# Patient Record
Sex: MALE | Race: WHITE | NOT HISPANIC OR LATINO | Employment: PART TIME | URBAN - METROPOLITAN AREA
[De-identification: names, ages, dates, MRNs, and addresses within clinical notes are randomized per-mention and may not be internally consistent; named-entity substitution may affect disease eponyms.]

---

## 2017-08-28 ENCOUNTER — NURSE TRIAGE (OUTPATIENT)
Dept: NURSING | Facility: CLINIC | Age: 52
End: 2017-08-28

## 2017-08-28 NOTE — TELEPHONE ENCOUNTER
"  Reason for Disposition    MILD rectal bleeding (more than just a few drops or streaks)    Additional Information    Negative: Shock suspected (e.g., cold/pale/clammy skin, too weak to stand, low BP, rapid pulse)    Negative: Difficult to awaken or acting confused  (e.g., disoriented, slurred speech)    Negative: Passed out (i.e., lost consciousness, collapsed and was not responding)    Negative: [1] Vomiting AND [2] contains red blood or black (\"coffee ground\") material  (Exception: few red streaks in vomit that only happened once)    Negative: Sounds like a life-threatening emergency to the triager    Negative: Diarrhea is main symptom    Negative: Stool color other than brown or tan is main concern  (no bleeding and no melena)    Negative: SEVERE rectal bleeding (large blood clots; on and off, or constant bleeding)    Negative: SEVERE dizziness (e.g., unable to stand, requires support to walk, feels like passing out now)    Negative: [1] MODERATE rectal bleeding (small blood clots, passing blood without stool, or toilet water turns red) AND [2] more than once a day    Negative: Pale skin (pallor) of new onset or worsening    Negative: Tarry or jet black-colored stool (not dark green)    Negative: [1] Constant abdominal pain AND [2] present > 2 hours    Negative: Rectal foreign body (i.e., now or within past week;  inserted or swallowed)    Negative: High-risk adult (e.g., prior surgery on aorta, abdominal aortic aneurysm)    Negative: Taking Coumadin (warfarin) or other strong blood thinner, or known bleeding disorder (e.g., thrombocytopenia)    Negative: Known cirrhosis of the liver (or history of liver failure or ascites)    Negative: [1] Colonoscopy AND [2] in past 72 hours    Negative: Patient sounds very sick or weak to the triager    Negative: MODERATE rectal bleeding (small blood clots, passing blood without stool, or toilet water turns red)    Protocols used: RECTAL BLEEDING-ADULT-AH  No clots or fever " or dizziness.

## 2017-08-29 ENCOUNTER — OFFICE VISIT (OUTPATIENT)
Dept: INTERNAL MEDICINE | Facility: CLINIC | Age: 52
End: 2017-08-29
Payer: COMMERCIAL

## 2017-08-29 VITALS
WEIGHT: 173 LBS | TEMPERATURE: 98.5 F | OXYGEN SATURATION: 99 % | SYSTOLIC BLOOD PRESSURE: 130 MMHG | DIASTOLIC BLOOD PRESSURE: 72 MMHG | HEART RATE: 65 BPM

## 2017-08-29 DIAGNOSIS — F12.10 CANNABIS ABUSE: ICD-10-CM

## 2017-08-29 DIAGNOSIS — K62.5 HEMORRHAGE OF RECTUM AND ANUS: Primary | ICD-10-CM

## 2017-08-29 DIAGNOSIS — Z13.6 CARDIOVASCULAR SCREENING; LDL GOAL LESS THAN 160: ICD-10-CM

## 2017-08-29 DIAGNOSIS — Z12.11 COLON CANCER SCREENING: ICD-10-CM

## 2017-08-29 PROCEDURE — 99202 OFFICE O/P NEW SF 15 MIN: CPT | Performed by: INTERNAL MEDICINE

## 2017-08-29 RX ORDER — FINASTERIDE 5 MG/1
TABLET, FILM COATED ORAL
Refills: 4 | COMMUNITY
Start: 2017-06-01

## 2017-08-29 NOTE — NURSING NOTE
Chief Complaint   Patient presents with     Rectal Problem       Initial /72  Pulse 65  Temp 98.5  F (36.9  C) (Oral)  Wt 173 lb (78.5 kg)  SpO2 99% There is no height or weight on file to calculate BMI.  Medication Reconciliation: complete

## 2017-08-29 NOTE — PROGRESS NOTES
"  SUBJECTIVE:   Samuel Urias is a 52 year old male who presents to clinic today for the following health issues:    New patient to me.    Rectal Bleeding, x 2 days but unsure if blood or related to a \"beet\" supplement that he is taking or not.  Taking supplement intermittently.    Problem list and histories reviewed & adjusted, as indicated.  Additional history: as documented    Patient Active Problem List   Diagnosis     CARDIOVASCULAR SCREENING; LDL GOAL LESS THAN 160     Cannabis abuse     No past surgical history on file.    Social History   Substance Use Topics     Smoking status: Never Smoker     Smokeless tobacco: Never Used     Alcohol use Not on file     No family history on file.      No current outpatient prescriptions on file.     Allergies not on file  BP Readings from Last 3 Encounters:   No data found for BP    Wt Readings from Last 3 Encounters:   No data found for Wt          Reviewed and updated as needed this visit by clinical staff     Reviewed and updated as needed this visit by Provider         ROS:  C: NEGATIVE for fever, chills, change in weight  E/M: NEGATIVE for ear, mouth and throat problems  R: NEGATIVE for significant cough or SOB  CV: NEGATIVE for chest pain, palpitations or peripheral edema  GI: NEGATIVE for nausea, abdominal pain, heartburn, or change in bowel habits  : NEGATIVE for frequency, dysuria, or hematuria  M: NEGATIVE for significant arthralgias or myalgia  P: NEGATIVE for changes in mood or affect    OBJECTIVE:                                                    /72  Pulse 65  Temp 98.5  F (36.9  C) (Oral)  Wt 173 lb (78.5 kg)  SpO2 99%  There is no height or weight on file to calculate BMI.  GENERAL: healthy, alert and no distress  RECTAL- male: no masses, no hemorrhoids less mild skin tags.  No visual blood.  PSYCH: Alert and oriented times 3; speech- coherent , normal rate and volume; able to articulate logical thoughts, able to abstract reason, no " tangential thoughts, no hallucinations or delusions, affect- normal     ASSESSMENT/PLAN:                                                      (K62.5) Hemorrhage of rectum and anus  (primary encounter diagnosis)  Comment: discussed options of care, stop beet supplement  Plan: Fecal colorectal cancer screen (FIT),         GASTROENTEROLOGY ADULT REF PROCEDURE ONLY            (Z12.11) Colon cancer screening  Comment: ADVISED COLONOSCOPY FOR ROUTINE PREVENTATIVE CARE.  Plan: Fecal colorectal cancer screen (FIT),         GASTROENTEROLOGY ADULT REF PROCEDURE ONLY            (Z13.6) CARDIOVASCULAR SCREENING; LDL GOAL LESS THAN 160  Comment: labs advised as fasting  Plan:     (F12.10) Cannabis abuse  Comment: advised cessation as per patient has been abusing use  Plan:     See Patient Instructions    Natan Puentes MD  [facility]    THE MEDICATION LIST HAS BEEN FULLY RECONCILED BY THE M.D. AND THE NURSING STAFF.

## 2017-08-29 NOTE — MR AVS SNAPSHOT
After Visit Summary   8/29/2017    Samuel Urias    MRN: [id number]           Patient Information     Date Of Birth          1965        Visit Information        Provider Department      8/29/2017 7:00 AM Natan Puentes MD [facility]        Today's Diagnoses     Hemorrhage of rectum and anus    -  1    Colon cancer screening        CARDIOVASCULAR SCREENING; LDL GOAL LESS THAN 160        Cannabis abuse           Follow-ups after your visit        Additional Services     GASTROENTEROLOGY ADULT REF PROCEDURE ONLY       Last Lab Result: No results found for: CR  There is no height or weight on file to calculate BMI.     Needed:  No  Language:  English    Patient will be contacted to schedule procedure.     Please be aware that coverage of these services is subject to the terms and limitations of your health insurance plan.  Call member services at your health plan with any benefit or coverage questions.  Any procedures must be performed at a [place] facility OR coordinated by your clinic's referral office.    Please bring the following with you to your appointment:    (1) Any X-Rays, CTs or MRIs which have been performed.  Contact the facility where they were done to arrange for  prior to your scheduled appointment.    (2) List of current medications   (3) This referral request   (4) Any documents/labs given to you for this referral                  Follow-up notes from your care team     Return if symptoms worsen or fail to improve.      Future tests that were ordered for you today     Open Future Orders        Priority Expected Expires Ordered    Fecal colorectal cancer screen (FIT) Routine 9/19/2017 11/21/2017 8/29/2017            Who to contact     If you have questions or need follow up information about today's clinic visit or your schedule please contact [facility] directly at [phone].  Normal or non-critical lab and  "imaging results will be communicated to you by MyChart, letter or phone within 4 business days after the clinic has received the results. If you do not hear from us within 7 days, please contact the clinic through [employer] or phone. If you have a critical or abnormal lab result, we will notify you by phone as soon as possible.  Submit refill requests through [employer] or call your pharmacy and they will forward the refill request to us. Please allow 3 business days for your refill to be completed.          Additional Information About Your Visit        [email or web address] Information     [employer] lets you send messages to your doctor, view your test results, renew your prescriptions, schedule appointments and more. To sign up, go to [email or web address] . Click on \"Log in\" on the left side of the screen, which will take you to the Welcome page. Then click on \"Sign up Now\" on the right side of the page.     You will be asked to enter the access code listed below, as well as some personal information. Please follow the directions to create your username and password.     Your access code is: [email or web address]  Expires: 2017  7:41 AM     Your access code will  in 90 days. If you need help or a new code, please call your [facility] clinic or [phone].        Care EveryWhere ID     This is your Care EveryWhere ID. This could be used by other organizations to access your [facility] medical records  [id number]        Your Vitals Were     Pulse Temperature Pulse Oximetry             65 98.5  F (36.9  C) (Oral) 99%          Blood Pressure from Last 3 Encounters:   17 130/72    Weight from Last 3 Encounters:   17 173 lb (78.5 kg)              We Performed the Following     GASTROENTEROLOGY ADULT REF PROCEDURE ONLY        Primary Care Provider    None Specified       No primary provider on file.        Equal Access to Services     CARLOS PÉREZ AH: cassy Mike qaybta kaalmada adeegyada, " philip davisonpieter belle'aan ah. So [facility] [phone].    ATENCIÓN: Si habla meggan, tiene a ramos disposición servicios gratuitos de asistencia lingüística. Deepti al [phone].    We comply with applicable federal civil rights laws and [place] laws. We do not discriminate on the basis of race, color, national origin, age, disability sex, sexual orientation or gender identity.            Thank you!     Thank you for choosing [facility]  for your care. Our goal is always to provide you with excellent care. Hearing back from our patients is one way we can continue to improve our services. Please take a few minutes to complete the written survey that you may receive in the mail after your visit with us. Thank you!             Your Updated Medication List - Protect others around you: Learn how to safely use, store and throw away your medicines at www.disposemymeds.org.          This list is accurate as of: 8/29/17  7:41 AM.  Always use your most recent med list.                   Brand Name Dispense Instructions for use Diagnosis    finasteride 5 MG tablet    PROSCAR     TAKE ONE-FOURTH TABLET BY MOUTH EVERY DAY        ROGTAWANNA MENS EXTRA STRENGTH EX

## 2017-09-21 ENCOUNTER — OFFICE VISIT (OUTPATIENT)
Dept: INTERNAL MEDICINE | Facility: CLINIC | Age: 52
End: 2017-09-21
Payer: COMMERCIAL

## 2017-09-21 VITALS
WEIGHT: 173.9 LBS | BODY MASS INDEX: 23.55 KG/M2 | HEART RATE: 66 BPM | DIASTOLIC BLOOD PRESSURE: 74 MMHG | OXYGEN SATURATION: 98 % | TEMPERATURE: 98.3 F | SYSTOLIC BLOOD PRESSURE: 118 MMHG | HEIGHT: 72 IN

## 2017-09-21 DIAGNOSIS — Z13.220 SCREENING FOR LIPID DISORDERS: ICD-10-CM

## 2017-09-21 DIAGNOSIS — Z00.00 ROUTINE GENERAL MEDICAL EXAMINATION AT A HEALTH CARE FACILITY: Primary | ICD-10-CM

## 2017-09-21 DIAGNOSIS — Z13.6 CARDIOVASCULAR SCREENING; LDL GOAL LESS THAN 160: ICD-10-CM

## 2017-09-21 LAB
ANION GAP SERPL CALCULATED.3IONS-SCNC: 8 MMOL/L (ref 3–14)
BUN SERPL-MCNC: 14 MG/DL (ref 7–30)
CALCIUM SERPL-MCNC: 9.2 MG/DL (ref 8.5–10.1)
CHLORIDE SERPL-SCNC: 104 MMOL/L (ref 94–109)
CHOLEST SERPL-MCNC: 179 MG/DL
CO2 SERPL-SCNC: 27 MMOL/L (ref 20–32)
CREAT SERPL-MCNC: 1.11 MG/DL (ref 0.66–1.25)
GFR SERPL CREATININE-BSD FRML MDRD: 70 ML/MIN/1.7M2
GLUCOSE SERPL-MCNC: 100 MG/DL (ref 70–99)
HDLC SERPL-MCNC: 54 MG/DL
LDLC SERPL CALC-MCNC: 105 MG/DL
NONHDLC SERPL-MCNC: 125 MG/DL
POTASSIUM SERPL-SCNC: 4.3 MMOL/L (ref 3.4–5.3)
SODIUM SERPL-SCNC: 139 MMOL/L (ref 133–144)
TRIGL SERPL-MCNC: 100 MG/DL

## 2017-09-21 PROCEDURE — 99396 PREV VISIT EST AGE 40-64: CPT | Performed by: INTERNAL MEDICINE

## 2017-09-21 PROCEDURE — 80048 BASIC METABOLIC PNL TOTAL CA: CPT | Performed by: INTERNAL MEDICINE

## 2017-09-21 PROCEDURE — 80061 LIPID PANEL: CPT | Performed by: INTERNAL MEDICINE

## 2017-09-21 PROCEDURE — 36415 COLL VENOUS BLD VENIPUNCTURE: CPT | Performed by: INTERNAL MEDICINE

## 2017-09-21 NOTE — MR AVS SNAPSHOT
After Visit Summary   9/21/2017    Samuel Urias    MRN: [id number]           Patient Information     Date Of Birth          1965        Visit Information        Provider Department      9/21/2017 7:40 AM Brandon Cox MD [facility]        Today's Diagnoses     Routine general medical examination at a health care facility    -  1    Screening for lipid disorders        CARDIOVASCULAR SCREENING; LDL GOAL LESS THAN 160          Care Instructions    The heart-healthy Mediterranean is a healthy eating plan based on typical foods and recipes of Mediterranean-style cooking. Here's how to adopt the Mediterranean diet.   If you're looking for a heart-healthy eating plan, the Mediterranean diet might be right for you. The Mediterranean diet incorporates the basics of healthy eating -- plus a splash of flavorful olive oil and perhaps even a glass of red wine -- among other components characterizing the traditional cooking style of countries bordering the [place].  Most healthy diets include fruits, vegetables, fish and whole grains, and limit unhealthy fats. While these parts of a healthy diet remain tried-and-true, subtle variations or differences in proportions of certain foods may make a difference in your risk of heart disease.  Research has shown that the traditional Mediterranean diet reduces the risk of heart disease. In fact, an analysis of more than 1.5 million healthy adults demonstrated that following a Mediterranean diet was associated with a reduced risk of death from heart disease and cancer, as well as a reduced incidence of Parkinson's and Alzheimer's diseases.   The Dietary Guidelines for Americans recommends the Mediterranean diet as an eating plan that can help promote health and prevent disease. And the Mediterranean diet is one your whole family can follow for good health.   The Mediterranean diet emphasizes:  Eating primarily plant-based foods,  "such as fruits and vegetables, whole grains, legumes and nuts   Replacing butter with healthy fats, such as olive oil   Using herbs and spices instead of salt to flavor foods   Limiting red meat to no more than a few times a month   Eating fish and poultry at least twice a week   Drinking red wine in moderation (optional)  The diet also recognizes the importance of being physically active, and enjoying meals with family and friends.  The Mediterranean diet traditionally includes fruits, vegetables and grains. For example, residents of [facility] average six or more servings a day of antioxidant-rich fruits and vegetables.  Grains in the Mediterranean region are typically whole grain and usually contain very few unhealthy trans fats, and bread is an important part of the diet. However, throughout the Mediterranean region, bread is eaten plain or dipped in olive oil -- not eaten with butter or margarine, which contains saturated or trans fats.   Nuts are another part of a healthy Mediterranean diet. Nuts are high in fat, but most of the fat is healthy. Because nuts are high in calories, they should not be eaten in large amounts -- generally no more than a handful a day. For the best nutrition, avoid candied or honey-roasted and heavily salted nuts.  The focus of the Mediterranean diet isn't on limiting total fat consumption, but rather on choosing healthier types of fat. The Mediterranean diet discourages saturated fats and hydrogenated oils (trans fats), both of which contribute to heart disease.  The Mediterranean diet features olive oil as the primary source of fat. Olive oil is mainly monounsaturated fat -- a type of fat that can help reduce low-density lipoprotein (LDL) cholesterol levels when used in place of saturated or trans fats. \"Extra-virgin\" and \"virgin\" olive oils (the least processed forms) also contain the highest levels of protective plant compounds that provide antioxidant effects.  Canola oil and some " nuts contain the beneficial linolenic acid (a type of omega-3 fatty acid) in addition to healthy unsaturated fat. Omega-3 fatty acids lower triglycerides, decrease blood clotting, and are associated with decreased incidence of sudden heart attacks, improve the health of your blood vessels, and help moderate blood pressure. Fatty fish -- such as mackerel, lake trout, herring, sardines, albacore tuna and salmon -- are rich sources of omega-3 fatty acids. Fish is eaten on a regular basis in the Mediterranean diet.  The health effects of alcohol have been debated for many years, and some doctors are reluctant to encourage alcohol consumption because of the health consequences of excessive drinking. However, alcohol -- in moderation -- has been associated with a reduced risk of heart disease in some research studies.  The Mediterranean diet typically includes a moderate amount of wine, usually red wine. This means no more than 5 ounces (148 milliliters) of wine daily for women of all ages and men older than age 65 and no more than 10 ounces (296 milliliters) of wine daily for younger men. More than this may increase the risk of health problems, including increased risk of certain types of cancer.   If you're unable to limit your alcohol intake to the amounts defined above, if you have a personal or family history of alcohol abuse, or if you have heart or liver disease, refrain from drinking wine or any other alcohol.  The Mediterranean diet is a delicious and healthy way to eat. Many people who switch to this style of eating say they'll never eat any other way. Here are some specific steps to get you started:   Eat your veggies and fruits -- and switch to whole grains. [place] of plant foods should make up the majority of your meals. They should be minimally processed -- fresh and whole are best. Include veggies and fruits in every meal and eat them for snacks as well. Switch to whole-grain bread and cereal, and begin  to eat more whole-grain rice and pasta products. Keep baby carrots, apples and bananas on hand for quick, satisfying snacks. Fruit salads are a wonderful way to eat a variety of healthy fruit.   Go nuts. Nuts and seeds are good sources of fiber, protein and healthy fats. Keep almonds, cashews, pistachios and walnuts on hand for a quick snack. Choose natural peanut butter, rather than the kind with hydrogenated fat added. Try blended sesame seeds (tahini) as a dip or spread for bread.   Pass on the butter. Try olive or canola oil as a healthy replacement for butter or margarine. Lightly drizzle it over vegetables. After cooking pasta, add a touch of olive oil, some garlic and green onions for flavoring. Dip bread in flavored olive oil or lightly spread it on whole-grain bread for a tasty alternative to butter. Try tahini as a dip or spread for bread too.   Spice it up. Herbs and spices make food tasty and can  for salt and fat in recipes.   Go fish. Eat fish at least twice a week. Fresh or water-packed tuna, salmon, trout, mackerel and herring are healthy choices. [place], bake or broil fish for great taste and easy cleanup. Avoid breaded and fried fish.   Rein in the red meat. Limit red meat to no more than a few times a month. Substitute fish and poultry for red meat. When choosing red meat, make sure it's lean and keep portions small (about the size of a deck of cards). Also avoid sausage, philip and other high-fat, processed meats.   Choose low-fat dairy. Limit higher fat dairy products, such as whole or 2 percent milk, cheese and ice cream. Switch to skim milk, fat-free yogurt and low-fat cheese         Treating Insomnia  Good sleeping habits are a key part of treatment. If needed, some medications may help you sleep better at first. Making healthy lifestyle changes and learning to relax can improve your sleep. Treating insomnia takes commitment, but trust that your efforts will pay off. Talk to your health  care provider before taking any medication.    Healthy Lifestyle  Your lifestyle affects your health and your sleep. Here are some healthy habits:    Keep a regular sleep schedule. Go to bed and get up at the same time each day.    Exercise regularly. It may help you reduce stress. Avoid strenuous exercise for two to four hours before bedtime.    Avoid or limit naps.    Use your bed only for sleep and sex.    Don t spend too much time in bed trying to fall asleep. If you can t fall asleep, get up and do something until you become tired and drowsy.    Avoid or limit caffeine and nicotine. They can keep you awake at night. Also avoid alcohol. It may help you fall asleep at first, but your sleep will not be restful.  Before Bedtime  To sleep better every night, try these tips:    Have a bedtime routine to let your body and mind know when it s time to sleep.    Going to bed should be relaxing so try to do only relaxing things around bedtime. Sleep will come sooner.    If your worries don t let you sleep, write them down in a diary. Then close it, and go to bed.    Make sure the room is not too hot or too cold. If it s not dark enough, an eye mask can help. If it s noisy, try using earplugs.  Learn to Relax  Stress, anxiety, and body tension may keep you awake at night. To unwind before bedtime, try reading a book, meditation, or yoga. Also, try the following:    Deep breathing. Sit or lie back in a chair. Take a slow, deep breath. Hold it for 5 counts. Then breathe out slowly through your mouth. Keep doing this until you feel relaxed.    Imagery. Think of the last fun trip you took. In your mind, walk through the trip from start to finish. Put as much detail into the memory as you can remember. It will help you relax.  Cognitive Behavioral Therapy (CBT)  CBT is the most effective treatment for long-term insomnia. It tries to address the underlying causes of your sleep problems, including your habits and how you think  about sleep.   Individual Therapy  Jose Velasquez, PhD  Insomnia   [facility]: [phone]    [facility]: [phone]  Group Therapy  Dates and times to be announced.  Online Programs    www.SHUTi.me (pronounced shut eye). There is a fee for this program. Enter the code  [place]  if you decide to enroll in this program.     www.sleepIO.com (pronounced sleep ee oh). There is a fee for this program. Enter the code  [place]  if you decide to enroll in this program.  Suggested Resources  Insomnia Treatment Books:    Overcoming Insomnia by Simon Fry and Carmela Ndiaye (2008)    No More Sleepless Nights by Pollo Burger and Genevieve Dickinson (1996)    Say Maggi to Insomnia by Alvin Flowers (2009)    The Insomnia Workbook by Verna Andrade and Leonardo Cool (2009)    The Insomnia Answer by Frankie Collazo and Tai Qiu (2006)?  Stress Management and Relaxation Books:    The Relaxation and Stress Reduction Workbook by Madison Gandhi, Julissa Callejas and Brandon Diaz (2008)    Stress Management Workbook: Techniques and Self-Assessment Procedures by Maryan Tavera and Isra Biggs (1997)    A Mindfulness-Based Stress Reduction Workbook by Hossein Ponce and Adrianne Rascon (2010)    The Complete Stress Management Workbook by Hugo Engle and Bill Calderón (1996)    Assert Yourself by Sabi Garcia and Mitchel Garcai (1977)  Relaxation Resources for Computer Download   These websites offer resources to help you relax. This list is for information only. [facility] is not responsible for the quality of services or the actions of any person or organization  Progressive Muscle Relaxation (PMR):    http://www.innerTelsimaudio.com/progressive-muscle-relaxation-exercise.html    http://studentsupport.[facility]/counseling/resources/self-help/relaxation-and-stress-management/  Deep Breathing  Exercises:    http://www.vitaMedMD/breathing-awareness.html  Meditation:       www.NutraMed    www.theFixmoguided-meditation-site.com You may have to pay for some of these resources.  Guided Imagery:    http://www.vitaMedMD/guided-imagery-scripts.html    http://[employer]/library/cyqelceyfb-abxycc-mggmbam/  Counseling / Behavioral Health  [facility]  Visit [email or web address] or call [phone] to find a clinic close to you.   This is not a prescription and these resources are optional. You must pay for any costs when using these resources. Please ask your insurance carrier if you can be reimbursed for these resources. If so, you are responsible for sending the needed details to your insurance carrier. These resources may also be tax deductible as medical expenses. Check with your .  These programs and publications are not affiliated in any way with [employer].    3873-1902 [employer]. [address] [place] [address]. All rights reserved. This information is not intended as a substitute for professional medical care. Always follow your healthcare professional's instructions.        Preventive Health Recommendations  Male Ages 50 - 64    Yearly exam:             See your health care provider every year in order to  o   Review health changes.   o   Discuss preventive care.    o   Review your medicines if your doctor has prescribed any.     Have a cholesterol test every 5 years, or more frequently if you are at risk for high cholesterol/heart disease.     Have a diabetes test (fasting glucose) every three years. If you are at risk for diabetes, you should have this test more often.     Have a colonoscopy at age 50, or have a yearly FIT test (stool test). These exams will check for colon cancer.      Talk with your health care provider about whether or not a prostate cancer screening test (PSA) is right for you.    You  "should be tested each year for STDs (sexually transmitted diseases), if you re at risk.     Shots: Get a flu shot each year. Get a tetanus shot every 10 years.     Nutrition:    Eat at least 5 servings of fruits and vegetables daily.     Eat whole-grain bread, whole-wheat pasta and brown rice instead of white grains and rice.     Talk to your provider about Calcium and Vitamin D.     Lifestyle    Exercise for at least 150 minutes a week (30 minutes a day, 5 days a week). This will help you control your weight and prevent disease.     Limit alcohol to one drink per day.     No smoking.     Wear sunscreen to prevent skin cancer.     See your dentist every six months for an exam and cleaning.     See your eye doctor every 1 to 2 years.      Shirlene Beltran [phone]          Follow-ups after your visit        Who to contact     If you have questions or need follow up information about today's clinic visit or your schedule please contact [facility] directly at [phone].  Normal or non-critical lab and imaging results will be communicated to you by [employer], letter or phone within 4 business days after the clinic has received the results. If you do not hear from us within 7 days, please contact the clinic through [employer] or phone. If you have a critical or abnormal lab result, we will notify you by phone as soon as possible.  Submit refill requests through [employer] or call your pharmacy and they will forward the refill request to us. Please allow 3 business days for your refill to be completed.          Additional Information About Your Visit        [employer] Information     [employer] lets you send messages to your doctor, view your test results, renew your prescriptions, schedule appointments and more. To sign up, go to [email or web address] . Click on \"Log in\" on the left side of the screen, which will take you to the Welcome page. Then click on \"Sign up Now\" on the right side of the " page.     You will be asked to enter the access code listed below, as well as some personal information. Please follow the directions to create your username and password.     Your access code is: [email or web address]  Expires: 2017  7:41 AM     Your access code will  in 90 days. If you need help or a new code, please call your [place] clinic or [phone].        Care EveryWhere ID     This is your Care EveryWhere ID. This could be used by other organizations to access your [place] medical records  [id number]        Your Vitals Were     Pulse Temperature Height Pulse Oximetry BMI (Body Mass Index)       66 98.3  F (36.8  C) (Oral) 6' (1.829 m) 98% 23.59 kg/m2        Blood Pressure from Last 3 Encounters:   17 118/74   17 130/72    Weight from Last 3 Encounters:   17 173 lb 14.4 oz (78.9 kg)   17 173 lb (78.5 kg)              We Performed the Following     Basic metabolic panel     LIPID REFLEX TO DIRECT LDL PANEL        Primary Care Provider    None Specified       No primary provider on file.        Equal Access to Services     [facility] Center[facility]OU : Hadii daniel Gonzalez, cassy dodge, bisi galeano, philip shah . So [facility] [phone].    ATENCIÓN: Si habla español, tiene a ramos disposición servicios gratuitos de asistencia lingüística. Llame al [phone].    We comply with applicable federal civil rights laws and [place] laws. We do not discriminate on the basis of race, color, national origin, age, disability, sex, sexual orientation, or gender identity.            Thank you!     Thank you for choosing [facility]  for your care. Our goal is always to provide you with excellent care. Hearing back from our patients is one way we can continue to improve our services. Please take a few minutes to complete the written survey that you may receive in the mail after your visit with us. Thank you!             Your  Updated Medication List - Protect others around you: Learn how to safely use, store and throw away your medicines at www.disposemymeds.org.          This list is accurate as of: 9/21/17 11:59 PM.  Always use your most recent med list.                   Brand Name Dispense Instructions for use Diagnosis    finasteride 5 MG tablet    PROSCAR     TAKE ONE-FOURTH TABLET BY MOUTH EVERY DAY        MORGAN UGALDE EXTRA STRENGTH EX

## 2017-09-21 NOTE — NURSING NOTE
Chief Complaint   Patient presents with     Physical       Initial /74  Pulse 66  Temp 98.3  F (36.8  C) (Oral)  Ht 6' (1.829 m)  Wt 173 lb 14.4 oz (78.9 kg)  SpO2 98%  BMI 23.59 kg/m2 Estimated body mass index is 23.59 kg/(m^2) as calculated from the following:    Height as of this encounter: 6' (1.829 m).    Weight as of this encounter: 173 lb 14.4 oz (78.9 kg).  Medication Reconciliation: complete

## 2017-09-21 NOTE — PATIENT INSTRUCTIONS
The heart-healthy Mediterranean is a healthy eating plan based on typical foods and recipes of Mediterranean-style cooking. Here's how to adopt the Mediterranean diet.   If you're looking for a heart-healthy eating plan, the Mediterranean diet might be right for you. The Mediterranean diet incorporates the basics of healthy eating -- plus a splash of flavorful olive oil and perhaps even a glass of red wine -- among other components characterizing the traditional cooking style of countries bordering the [place].  Most healthy diets include fruits, vegetables, fish and whole grains, and limit unhealthy fats. While these parts of a healthy diet remain tried-and-true, subtle variations or differences in proportions of certain foods may make a difference in your risk of heart disease.  Research has shown that the traditional Mediterranean diet reduces the risk of heart disease. In fact, an analysis of more than 1.5 million healthy adults demonstrated that following a Mediterranean diet was associated with a reduced risk of death from heart disease and cancer, as well as a reduced incidence of Parkinson's and Alzheimer's diseases.   The Dietary Guidelines for Americans recommends the Mediterranean diet as an eating plan that can help promote health and prevent disease. And the Mediterranean diet is one your whole family can follow for good health.   The Mediterranean diet emphasizes:  Eating primarily plant-based foods, such as fruits and vegetables, whole grains, legumes and nuts   Replacing butter with healthy fats, such as olive oil   Using herbs and spices instead of salt to flavor foods   Limiting red meat to no more than a few times a month   Eating fish and poultry at least twice a week   Drinking red wine in moderation (optional)  The diet also recognizes the importance of being physically active, and enjoying meals with family and friends.  The Mediterranean diet traditionally includes fruits,  "vegetables and grains. For example, residents of [facility] average six or more servings a day of antioxidant-rich fruits and vegetables.  Grains in the Mediterranean region are typically whole grain and usually contain very few unhealthy trans fats, and bread is an important part of the diet. However, throughout the Mediterranean region, bread is eaten plain or dipped in olive oil -- not eaten with butter or margarine, which contains saturated or trans fats.   Nuts are another part of a healthy Mediterranean diet. Nuts are high in fat, but most of the fat is healthy. Because nuts are high in calories, they should not be eaten in large amounts -- generally no more than a handful a day. For the best nutrition, avoid candied or honey-roasted and heavily salted nuts.  The focus of the Mediterranean diet isn't on limiting total fat consumption, but rather on choosing healthier types of fat. The Mediterranean diet discourages saturated fats and hydrogenated oils (trans fats), both of which contribute to heart disease.  The Mediterranean diet features olive oil as the primary source of fat. Olive oil is mainly monounsaturated fat -- a type of fat that can help reduce low-density lipoprotein (LDL) cholesterol levels when used in place of saturated or trans fats. \"Extra-virgin\" and \"virgin\" olive oils (the least processed forms) also contain the highest levels of protective plant compounds that provide antioxidant effects.  Canola oil and some nuts contain the beneficial linolenic acid (a type of omega-3 fatty acid) in addition to healthy unsaturated fat. Omega-3 fatty acids lower triglycerides, decrease blood clotting, and are associated with decreased incidence of sudden heart attacks, improve the health of your blood vessels, and help moderate blood pressure. Fatty fish -- such as mackerel, lake trout, herring, sardines, albacore tuna and salmon -- are rich sources of omega-3 fatty acids. Fish is eaten on a regular basis in " the Mediterranean diet.  The health effects of alcohol have been debated for many years, and some doctors are reluctant to encourage alcohol consumption because of the health consequences of excessive drinking. However, alcohol -- in moderation -- has been associated with a reduced risk of heart disease in some research studies.  The Mediterranean diet typically includes a moderate amount of wine, usually red wine. This means no more than 5 ounces (148 milliliters) of wine daily for women of all ages and men older than age 65 and no more than 10 ounces (296 milliliters) of wine daily for younger men. More than this may increase the risk of health problems, including increased risk of certain types of cancer.   If you're unable to limit your alcohol intake to the amounts defined above, if you have a personal or family history of alcohol abuse, or if you have heart or liver disease, refrain from drinking wine or any other alcohol.  The Mediterranean diet is a delicious and healthy way to eat. Many people who switch to this style of eating say they'll never eat any other way. Here are some specific steps to get you started:   Eat your veggies and fruits -- and switch to whole grains. [place] of plant foods should make up the majority of your meals. They should be minimally processed -- fresh and whole are best. Include veggies and fruits in every meal and eat them for snacks as well. Switch to whole-grain bread and cereal, and begin to eat more whole-grain rice and pasta products. Keep baby carrots, apples and bananas on hand for quick, satisfying snacks. Fruit salads are a wonderful way to eat a variety of healthy fruit.   Go nuts. Nuts and seeds are good sources of fiber, protein and healthy fats. Keep almonds, cashews, pistachios and walnuts on hand for a quick snack. Choose natural peanut butter, rather than the kind with hydrogenated fat added. Try blended sesame seeds (tahini) as a dip or spread for bread.    Pass on the butter. Try olive or canola oil as a healthy replacement for butter or margarine. Lightly drizzle it over vegetables. After cooking pasta, add a touch of olive oil, some garlic and green onions for flavoring. Dip bread in flavored olive oil or lightly spread it on whole-grain bread for a tasty alternative to butter. Try tahini as a dip or spread for bread too.   Spice it up. Herbs and spices make food tasty and can  for salt and fat in recipes.   Go fish. Eat fish at least twice a week. Fresh or water-packed tuna, salmon, trout, mackerel and herring are healthy choices. [place], bake or broil fish for great taste and easy cleanup. Avoid breaded and fried fish.   Rein in the red meat. Limit red meat to no more than a few times a month. Substitute fish and poultry for red meat. When choosing red meat, make sure it's lean and keep portions small (about the size of a deck of cards). Also avoid sausage, philip and other high-fat, processed meats.   Choose low-fat dairy. Limit higher fat dairy products, such as whole or 2 percent milk, cheese and ice cream. Switch to skim milk, fat-free yogurt and low-fat cheese         Treating Insomnia  Good sleeping habits are a key part of treatment. If needed, some medications may help you sleep better at first. Making healthy lifestyle changes and learning to relax can improve your sleep. Treating insomnia takes commitment, but trust that your efforts will pay off. Talk to your health care provider before taking any medication.    Healthy Lifestyle  Your lifestyle affects your health and your sleep. Here are some healthy habits:    Keep a regular sleep schedule. Go to bed and get up at the same time each day.    Exercise regularly. It may help you reduce stress. Avoid strenuous exercise for two to four hours before bedtime.    Avoid or limit naps.    Use your bed only for sleep and sex.    Don t spend too much time in bed trying to fall asleep. If you can t fall  asleep, get up and do something until you become tired and drowsy.    Avoid or limit caffeine and nicotine. They can keep you awake at night. Also avoid alcohol. It may help you fall asleep at first, but your sleep will not be restful.  Before Bedtime  To sleep better every night, try these tips:    Have a bedtime routine to let your body and mind know when it s time to sleep.    Going to bed should be relaxing so try to do only relaxing things around bedtime. Sleep will come sooner.    If your worries don t let you sleep, write them down in a diary. Then close it, and go to bed.    Make sure the room is not too hot or too cold. If it s not dark enough, an eye mask can help. If it s noisy, try using earplugs.  Learn to Relax  Stress, anxiety, and body tension may keep you awake at night. To unwind before bedtime, try reading a book, meditation, or yoga. Also, try the following:    Deep breathing. Sit or lie back in a chair. Take a slow, deep breath. Hold it for 5 counts. Then breathe out slowly through your mouth. Keep doing this until you feel relaxed.    Imagery. Think of the last fun trip you took. In your mind, walk through the trip from start to finish. Put as much detail into the memory as you can remember. It will help you relax.  Cognitive Behavioral Therapy (CBT)  CBT is the most effective treatment for long-term insomnia. It tries to address the underlying causes of your sleep problems, including your habits and how you think about sleep.   Individual Therapy  Jose Velasquez, PhD  Insomnia   [facility]: [phone]    [facility]: [phone]  Group Therapy  Dates and times to be announced.  Online Programs    www.SHUTi.me (pronounced shut eye). There is a fee for this program. Enter the code  [employer]OT[employer]S (formerly [employer])  if you decide to enroll in this program.     www.sleepIO.com (pronounced sleep ee oh). There is a fee for this program. Enter the code   Jhonny  if you decide to enroll in this program.  Suggested Resources  Insomnia Treatment Books:    Overcoming Insomnia by Simon Fry and Carmela Ndiaye (2008)    No More Sleepless Nights by Pollo Burger and Genevieve Dickinson (1996)    Say Maggi to Insomnia by Alvin Flowers (2009)    The Insomnia Workbook by Verna Andrade and Leonardo Cool (2009)    The Insomnia Answer by Frankie Collazo and Tai Qiu (2006)?  Stress Management and Relaxation Books:    The Relaxation and Stress Reduction Workbook by Madison Gandhi, Julissa Callejas and Brandon Diaz (2008)    Stress Management Workbook: Techniques and Self-Assessment Procedures by Maryan Tavera and Isra Biggs (1997)    A Mindfulness-Based Stress Reduction Workbook by Hossein Ponce and Adrianne Rascon (2010)    The Complete Stress Management Workbook by Damaso Galeano, Hugo Ford and Bill Calderón (1996)    Assert Yourself by Sabi Gracia and Mitchel Garcia (1977)  Relaxation Resources for Computer Download   These websites offer resources to help you relax. This list is for information only. [place] is not responsible for the quality of services or the actions of any person or organization  Progressive Muscle Relaxation (PMR):    http://www.CityIN/progressive-muscle-relaxation-exercise.html    http://studentsupport.[facility]/counseling/resources/self-help/relaxation-and-stress-management/  Deep Breathing Exercises:    http://www.[employer]/breathing-awareness.html  Meditation:       www.SnapLayoutrantheartExpan    www.theVamoguided-meditation-site.com You may have to pay for some of these resources.  Guided Imagery:    http://www.City[employer]/guided-imagery-scripts.html    http://Ooolala/library/gdsyauzacz-ojjvfp-rogshii/  Counseling / Behavioral Health  [facility]  Visit www.[email or web address] or call [phone] to find a clinic close to  you.   This is not a prescription and these resources are optional. You must pay for any costs when using these resources. Please ask your insurance carrier if you can be reimbursed for these resources. If so, you are responsible for sending the needed details to your insurance carrier. These resources may also be tax deductible as medical expenses. Check with your .  These programs and publications are not affiliated in any way with [employer].    7285-8191 [employer]. [address] [place] [address], [place] [id number]. All rights reserved. This information is not intended as a substitute for professional medical care. Always follow your healthcare professional's instructions.        Preventive Health Recommendations  Male Ages 50 - 64    Yearly exam:             See your health care provider every year in order to  o   Review health changes.   o   Discuss preventive care.    o   Review your medicines if your doctor has prescribed any.     Have a cholesterol test every 5 years, or more frequently if you are at risk for high cholesterol/heart disease.     Have a diabetes test (fasting glucose) every three years. If you are at risk for diabetes, you should have this test more often.     Have a colonoscopy at age 50, or have a yearly FIT test (stool test). These exams will check for colon cancer.      Talk with your health care provider about whether or not a prostate cancer screening test (PSA) is right for you.    You should be tested each year for STDs (sexually transmitted diseases), if you re at risk.     Shots: Get a flu shot each year. Get a tetanus shot every 10 years.     Nutrition:    Eat at least 5 servings of fruits and vegetables daily.     Eat whole-grain bread, whole-wheat pasta and brown rice instead of white grains and rice.     Talk to your provider about Calcium and Vitamin D.     Lifestyle    Exercise for at least 150 minutes a week (30 minutes a day, 5 days a week). This will help  you control your weight and prevent disease.     Limit alcohol to one drink per day.     No smoking.     Wear sunscreen to prevent skin cancer.     See your dentist every six months for an exam and cleaning.     See your eye doctor every 1 to 2 years.      Shirlene Beltran [phone]

## 2017-09-21 NOTE — PROGRESS NOTES
SUBJECTIVE:   CC: Samuel Urias is an 52 year old male who presents for preventative health visit.     Physical   Annual:     Getting at least 3 servings of Calcium per day::  Yes    Bi-annual eye exam::  NO    Dental care twice a year::  Yes    Sleep apnea or symptoms of sleep apnea::  None    Taking medications regularly::  Yes    Medication side effects::  Not applicable    Additional concerns today::  YES  some insomnia after losing his job and starting a new one but this seems to have improved here in the last few days to week    Today's PHQ-2 Score:   PHQ-2 ( 1999 [employer]) 9/21/2017   Q1: Little interest or pleasure in doing things 0   Q2: Feeling down, depressed or hopeless 0   PHQ-2 Score 0   Q1: Little interest or pleasure in doing things Not at all   Q2: Feeling down, depressed or hopeless Not at all   PHQ-2 Score 0       Abuse: Current or Past(Physical, Sexual or Emotional)- No  Do you feel safe in your environment - Yes    Social History   Substance Use Topics     Smoking status: Never Smoker     Smokeless tobacco: Never Used     Alcohol use Yes      Comment: rarely     The patient does not drink >3 drinks per day nor >7 drinks per week.    Last PSA: No results found for: PSA    Reviewed orders with patient. Reviewed health maintenance and updated orders accordingly - Yes      Reviewed and updated as needed this visit by clinical staff  Tobacco  Allergies  Med Hx  Soc Hx        Reviewed and updated as needed this visit by Provider  Tobacco  Med Hx  Soc Hx             ROS:  C: NEGATIVE for fever, chills, change in weight  I: NEGATIVE for worrisome rashes, moles or lesions  E: NEGATIVE for vision changes or irritation  ENT: NEGATIVE for ear, mouth and throat problems  R: NEGATIVE for significant cough or SOB  CV: NEGATIVE for chest pain, palpitations or peripheral edema  GI: NEGATIVE for nausea, abdominal pain, heartburn, or change in bowel habits   male: negative for dysuria, hematuria, decreased  urinary stream, erectile dysfunction, urethral discharge  M: NEGATIVE for significant arthralgias or myalgia  N: NEGATIVE for weakness, dizziness or paresthesias  P: NEGATIVE for changes in mood or affect    OBJECTIVE:   /74  Pulse 66  Temp 98.3  F (36.8  C) (Oral)  Ht 6' (1.829 m)  Wt 173 lb 14.4 oz (78.9 kg)  SpO2 98%  BMI 23.59 kg/m2    EXAM:  GENERAL: healthy, alert and no distress  EYES: Eyes grossly normal to inspection, PERRL and conjunctivae and sclerae normal  HENT: ear canals and TM's normal, nose and mouth without ulcers or lesions  NECK: no adenopathy, no asymmetry, masses, or scars and thyroid normal to palpation  RESP: lungs clear to auscultation - no rales, rhonchi or wheezes  CV: regular rate and rhythm, normal S1 S2, no S3 or S4, no murmur, click or rub, no peripheral edema and peripheral pulses strong  ABDOMEN: soft, nontender, no hepatosplenomegaly, no masses and bowel sounds normal  MS: no gross musculoskeletal defects noted, no edema  SKIN: no suspicious lesions or rashes  NEURO: Normal strength and tone, mentation intact and speech normal  PSYCH: mentation appears normal, affect normal/bright  LYMPH: no cervical, supraclavicular, axillary, or inguinal adenopathy    Results for orders placed or performed in visit on 09/21/17   LIPID REFLEX TO DIRECT LDL PANEL   Result Value Ref Range    Cholesterol 179 <200 mg/dL    Triglycerides 100 <150 mg/dL    HDL Cholesterol 54 >39 mg/dL    LDL Cholesterol Calculated 105 (H) <100 mg/dL    Non HDL Cholesterol 125 <130 mg/dL   Basic metabolic panel   Result Value Ref Range    Sodium 139 133 - 144 mmol/L    Potassium 4.3 3.4 - 5.3 mmol/L    Chloride 104 94 - 109 mmol/L    Carbon Dioxide 27 20 - 32 mmol/L    Anion Gap 8 3 - 14 mmol/L    Glucose 100 (H) 70 - 99 mg/dL    Urea Nitrogen 14 7 - 30 mg/dL    Creatinine 1.11 0.66 - 1.25 mg/dL    GFR Estimate 70 >60 mL/min/1.7m2    GFR Estimate If Black 84 >60 mL/min/1.7m2    Calcium 9.2 8.5 - 10.1 mg/dL       ASSESSMENT/PLAN:   1. Routine general medical examination at a [facility] facility  Colon ca screening   The 10-year ASCVD risk score ([place] Millsc[place]andra ROBB Jr, et al., 2013) is: 2.6%    Values used to calculate the score:      Age: 52 years      Sex: Male      Is Non- : No      Diabetic: No      Tobacco smoker: No      Systolic Blood Pressure: 118 mmHg      Is BP treated: No      HDL Cholesterol: 54 mg/dL      Total Cholesterol: 179 mg/dL     2. Screening for lipid disorders  - LIPID REFLEX TO DIRECT LDL PANEL    3. CARDIOVASCULAR SCREENING; LDL GOAL LESS THAN 160  - Basic metabolic panel    COUNSELING:   Reviewed preventive health counseling, as reflected in patient instructions           reports that he has never smoked. He has never used smokeless tobacco.      Estimated body mass index is 23.59 kg/(m^2) as calculated from the following:    Height as of this encounter: 6' (1.829 m).    Weight as of this encounter: 173 lb 14.4 oz (78.9 kg).         Counseling Resources:  ATP IV Guidelines  Pooled Cohorts Equation Calculator  FRAX Risk Assessment  ICSI Preventive Guidelines  Dietary Guidelines for Americans, 2010  Watch Over Me's MyPlate  ASA Prophylaxis  Lung CA Screening    Brandon Cox MD  [facility] for HPI/ROS submitted by the patient on 9/21/2017   PHQ-2 Score: 0

## 2017-09-21 NOTE — LETTER
October 4, 2017      Samuel Adonay  [address] [facility] [address]        Dear ,    We are writing to inform you of your test results.    Your test results fall within the expected range(s) or remain unchanged from previous results.  Please continue with current treatment plan.    Resulted Orders   LIPID REFLEX TO DIRECT LDL PANEL   Result Value Ref Range    Cholesterol 179 <200 mg/dL    Triglycerides 100 <150 mg/dL      Comment:      Fasting specimen    HDL Cholesterol 54 >39 mg/dL    LDL Cholesterol Calculated 105 (H) <100 mg/dL      Comment:      Above desirable:  100-129 mg/dl  Borderline High:  130-159 mg/dL  High:             160-189 mg/dL  Very high:       >189 mg/dl      Non HDL Cholesterol 125 <130 mg/dL   Basic metabolic panel   Result Value Ref Range    Sodium 139 133 - 144 mmol/L    Potassium 4.3 3.4 - 5.3 mmol/L    Chloride 104 94 - 109 mmol/L    Carbon Dioxide 27 20 - 32 mmol/L    Anion Gap 8 3 - 14 mmol/L    Glucose 100 (H) 70 - 99 mg/dL      Comment:      Fasting specimen    Urea Nitrogen 14 7 - 30 mg/dL    Creatinine 1.11 0.66 - 1.25 mg/dL    GFR Estimate 70 >60 mL/min/1.7m2      Comment:      Non  GFR Calc    GFR Estimate If Black 84 >60 mL/min/1.7m2      Comment:       GFR Calc    Calcium 9.2 8.5 - 10.1 mg/dL       Using the new American Heart Association risk calculator your 10 yr risk of global cardiovascular disease (heart attack, stroke) is 2.6%.    At any risk level > 7.5% we recommend use of a statin cholesterol lowering medication.    If you have any questions or concerns, please call the clinic at the number listed above.       Sincerely,        Brandon Cox MD

## 2017-09-28 ENCOUNTER — TELEPHONE (OUTPATIENT)
Dept: INTERNAL MEDICINE | Facility: CLINIC | Age: 52
End: 2017-09-28

## 2017-09-28 NOTE — TELEPHONE ENCOUNTER
Reason for Call:  Other copy of medical records regarding doctor appt of 09.21.17    Detailed comments: the patient would like a copy of the medical records regarding appt of 09.21.17 mailed to his property address on file    Phone Number Patient can be reached at: Home number on file [phone] (home)    Best Time: anytime    Can we leave a detailed message on this number? YES    Call taken on 9/28/2017 at 4:33 PM by Jasmin Mcclain

## 2020-08-24 ENCOUNTER — OFFICE VISIT (OUTPATIENT)
Dept: INTERNAL MEDICINE | Facility: CLINIC | Age: 55
End: 2020-08-24
Payer: COMMERCIAL

## 2020-08-24 VITALS
HEIGHT: 73 IN | BODY MASS INDEX: 22.64 KG/M2 | DIASTOLIC BLOOD PRESSURE: 70 MMHG | HEART RATE: 66 BPM | TEMPERATURE: 98.1 F | OXYGEN SATURATION: 99 % | WEIGHT: 170.8 LBS | SYSTOLIC BLOOD PRESSURE: 112 MMHG

## 2020-08-24 DIAGNOSIS — Z11.4 SCREENING FOR HIV (HUMAN IMMUNODEFICIENCY VIRUS): ICD-10-CM

## 2020-08-24 DIAGNOSIS — Z12.5 SCREENING FOR PROSTATE CANCER: ICD-10-CM

## 2020-08-24 DIAGNOSIS — Z13.220 LIPID SCREENING: ICD-10-CM

## 2020-08-24 DIAGNOSIS — Z12.11 SCREEN FOR COLON CANCER: ICD-10-CM

## 2020-08-24 DIAGNOSIS — Z13.6 CARDIOVASCULAR SCREENING; LDL GOAL LESS THAN 160: ICD-10-CM

## 2020-08-24 DIAGNOSIS — Z23 NEED FOR VACCINATION: ICD-10-CM

## 2020-08-24 DIAGNOSIS — Z00.00 ROUTINE GENERAL MEDICAL EXAMINATION AT A HEALTH CARE FACILITY: Primary | ICD-10-CM

## 2020-08-24 LAB — PSA SERPL-ACNC: 0.47 UG/L (ref 0–4)

## 2020-08-24 PROCEDURE — 36415 COLL VENOUS BLD VENIPUNCTURE: CPT | Performed by: INTERNAL MEDICINE

## 2020-08-24 PROCEDURE — 90471 IMMUNIZATION ADMIN: CPT | Performed by: INTERNAL MEDICINE

## 2020-08-24 PROCEDURE — 80061 LIPID PANEL: CPT | Performed by: INTERNAL MEDICINE

## 2020-08-24 PROCEDURE — G0103 PSA SCREENING: HCPCS | Performed by: INTERNAL MEDICINE

## 2020-08-24 PROCEDURE — 99396 PREV VISIT EST AGE 40-64: CPT | Mod: 25 | Performed by: INTERNAL MEDICINE

## 2020-08-24 PROCEDURE — 80048 BASIC METABOLIC PNL TOTAL CA: CPT | Performed by: INTERNAL MEDICINE

## 2020-08-24 PROCEDURE — 90750 HZV VACC RECOMBINANT IM: CPT | Performed by: INTERNAL MEDICINE

## 2020-08-24 SDOH — HEALTH STABILITY: MENTAL HEALTH: HOW OFTEN DO YOU HAVE A DRINK CONTAINING ALCOHOL?: MONTHLY OR LESS

## 2020-08-24 SDOH — HEALTH STABILITY: MENTAL HEALTH: HOW OFTEN DO YOU HAVE 6 OR MORE DRINKS ON ONE OCCASION?: NEVER

## 2020-08-24 SDOH — HEALTH STABILITY: MENTAL HEALTH: HOW MANY STANDARD DRINKS CONTAINING ALCOHOL DO YOU HAVE ON A TYPICAL DAY?: 1 OR 2

## 2020-08-24 ASSESSMENT — MIFFLIN-ST. JEOR: SCORE: 1668.62

## 2020-08-24 NOTE — PROGRESS NOTES
SUBJECTIVE:   CC: Samuel Urias is an 55 year old male who presents for preventative health visit.     Healthy Habits:     Getting at least 3 servings of Calcium per day:  NO    Bi-annual eye exam:  NO    Dental care twice a year:  Yes    Sleep apnea or symptoms of sleep apnea:  Daytime drowsiness    Diet:  Regular (no restrictions)    Frequency of exercise:  2-3 days/week    Duration of exercise:  15-30 minutes    Taking medications regularly:  Yes    Medication side effects:  Not applicable    PHQ-2 Total Score: 0    Additional concerns today:  Yes          Today's PHQ-2 Score:   PHQ-2 ( 1999 Pfizer) 8/24/2020   Q1: Little interest or pleasure in doing things 0   Q2: Feeling down, depressed or hopeless 0   PHQ-2 Score 0   Q1: Little interest or pleasure in doing things Not at all   Q2: Feeling down, depressed or hopeless Not at all   PHQ-2 Score 0       Abuse: Current or Past(Physical, Sexual or Emotional)- No  Do you feel safe in your environment? Yes        Social History     Tobacco Use     Smoking status: Never Smoker     Smokeless tobacco: Never Used   Substance Use Topics     Alcohol use: Yes     Comment: rarely         Alcohol Use 8/24/2020   Prescreen: >3 drinks/day or >7 drinks/week? No   Prescreen: >3 drinks/day or >7 drinks/week? -       Last PSA: No results found for: PSA    Reviewed orders with patient. Reviewed health maintenance and updated orders accordingly - Yes  Lab work is in process    Reviewed and updated as needed this visit by clinical staff  Tobacco  Allergies  Meds         Reviewed and updated as needed this visit by Provider            Review of Systems  CONSTITUTIONAL: NEGATIVE for fever, chills, change in weight  INTEGUMENTARY/SKIN: NEGATIVE for worrisome rashes, moles or lesions  EYES: NEGATIVE for vision changes or irritation  ENT: NEGATIVE for ear, mouth and throat problems  RESP: NEGATIVE for significant cough or SOB  CV: NEGATIVE for chest pain, palpitations or peripheral  "edema  GI: NEGATIVE for nausea, abdominal pain, heartburn, or change in bowel habits   male: negative for dysuria, hematuria, decreased urinary stream, erectile dysfunction, urethral discharge  MUSCULOSKELETAL: NEGATIVE for significant arthralgias or myalgia  NEURO: NEGATIVE for weakness, dizziness or paresthesias  ENDOCRINE: NEGATIVE for temperature intolerance, skin/hair changes  HEME/ALLERGY/IMMUNE: NEGATIVE for bleeding problems  PSYCHIATRIC: NEGATIVE for changes in mood or affect    OBJECTIVE:   /70 (Cuff Size: Adult Regular)   Pulse 66   Temp 98.1  F (36.7  C) (Oral)   Ht 6' 1\" (1.854 m)   Wt 170 lb 12.8 oz (77.5 kg)   SpO2 99%   BMI 22.53 kg/m      Physical Exam  GENERAL: healthy, alert and no distress  EYES: Eyes grossly normal to inspection, PERRL and conjunctivae and sclerae normal  HENT: ear canals and TM's normal, nose and mouth without ulcers or lesions  NECK: no adenopathy, no asymmetry, masses, or scars and thyroid normal to palpation  RESP: lungs clear to auscultation - no rales, rhonchi or wheezes  CV: regular rate and rhythm, normal S1 S2, no S3 or S4, no murmur, click or rub, no peripheral edema and peripheral pulses strong  ABDOMEN: soft, nontender, no hepatosplenomegaly, no masses and bowel sounds normal  MS: no gross musculoskeletal defects noted, no edema  SKIN: no suspicious lesions or rashes  NEURO: Normal strength and tone, mentation intact and speech normal  PSYCH: mentation appears normal, affect normal/bright  LYMPH: no cervical, supraclavicular, axillary, or inguinal adenopathy    Labs reviewed in Epic    ASSESSMENT/PLAN:   1. Routine general medical examination at a health care facility  Cancer screening is not up-to-date patient decided on FIT testing over colonoscopy or Cologuard.  Order placed patient to  today.  Patient will start prostate cancer screening with PSA.  Note he does take finasteride in the low 1 mg to 1.25 mg dose for male pattern baldness.    2. " "Need for vaccination  - SHINGRIX [55130]  - 1st  Administration  [84056]    3. Screen for colon cancer  - Fecal colorectal cancer screen FIT - Future (S+30); Future    4. Screening for HIV (human immunodeficiency virus)  - HIV Screening; Future    5. Screening for prostate cancer  - Prostate spec antigen screen    6. Lipid screening  - Lipid panel reflex to direct LDL Fasting    7. CARDIOVASCULAR SCREENING; LDL GOAL LESS THAN 160  - Basic metabolic panel    COUNSELING:   Reviewed preventive health counseling, as reflected in patient instructions    Estimated body mass index is 22.53 kg/m  as calculated from the following:    Height as of this encounter: 6' 1\" (1.854 m).    Weight as of this encounter: 170 lb 12.8 oz (77.5 kg).          reports that he has never smoked. He has never used smokeless tobacco.      Counseling Resources:  ATP IV Guidelines  Pooled Cohorts Equation Calculator  FRAX Risk Assessment  ICSI Preventive Guidelines  Dietary Guidelines for Americans, 2010  USDA's MyPlate  ASA Prophylaxis  Lung CA Screening    Brandon Cox MD  [facility]  "

## 2020-08-24 NOTE — LETTER
September 2, 2020      Samuel Urias  [address] [facility] [address]        Dear ,    We are writing to inform you of your test results.    Your test results are below.  Using the new American Heart Association risk calculator,  your 10 year risk of global cardiovascular disease (heart attack, stroke) is 4%.  At any risk level > 7.5-10% we should consider the use of a statin cholesterol lowering medication.  This has been shown to help prevent heart disease and strokes when risk is at, or above this level.       Resulted Orders   Prostate spec antigen screen   Result Value Ref Range    PSA 0.47 0 - 4 ug/L      Comment:      Assay Method:  Chemiluminescence using Siemens Vista analyzer   Lipid panel reflex to direct LDL Fasting   Result Value Ref Range    Cholesterol 208 (H) <200 mg/dL      Comment:      Desirable:       <200 mg/dl    Triglycerides 88 <150 mg/dL    HDL Cholesterol 54 >39 mg/dL    LDL Cholesterol Calculated 136 (H) <100 mg/dL      Comment:      Above desirable:  100-129 mg/dl  Borderline High:  130-159 mg/dL  High:             160-189 mg/dL  Very high:       >189 mg/dl      Non HDL Cholesterol 154 (H) <130 mg/dL      Comment:      Above Desirable:  130-159 mg/dl  Borderline high:  160-189 mg/dl  High:             190-219 mg/dl  Very high:       >219 mg/dl     Basic metabolic panel   Result Value Ref Range    Sodium 137 133 - 144 mmol/L    Potassium 4.6 3.4 - 5.3 mmol/L    Chloride 105 94 - 109 mmol/L    Carbon Dioxide 26 20 - 32 mmol/L    Anion Gap 6 3 - 14 mmol/L    Glucose 98 70 - 99 mg/dL    Urea Nitrogen 15 7 - 30 mg/dL    Creatinine 1.11 0.66 - 1.25 mg/dL    GFR Estimate 75 >60 mL/min/[1.73_m2]      Comment:      Non  GFR Calc  Starting 12/18/2018, serum creatinine based estimated GFR (eGFR) will be   calculated using the Chronic Kidney Disease Epidemiology Collaboration   (CKD-EPI) equation.      GFR Estimate If Black 86 >60 mL/min/[1.73_m2]      Comment:        GFR Calc  Starting 12/18/2018, serum creatinine based estimated GFR (eGFR) will be   calculated using the Chronic Kidney Disease Epidemiology Collaboration   (CKD-EPI) equation.      Calcium 9.2 8.5 - 10.1 mg/dL       If you have any questions or concerns, please call the clinic at the number listed above.       Sincerely,        Brandon Cox MD

## 2020-08-25 LAB
ANION GAP SERPL CALCULATED.3IONS-SCNC: 6 MMOL/L (ref 3–14)
BUN SERPL-MCNC: 15 MG/DL (ref 7–30)
CALCIUM SERPL-MCNC: 9.2 MG/DL (ref 8.5–10.1)
CHLORIDE SERPL-SCNC: 105 MMOL/L (ref 94–109)
CHOLEST SERPL-MCNC: 208 MG/DL
CO2 SERPL-SCNC: 26 MMOL/L (ref 20–32)
CREAT SERPL-MCNC: 1.11 MG/DL (ref 0.66–1.25)
GFR SERPL CREATININE-BSD FRML MDRD: 75 ML/MIN/{1.73_M2}
GLUCOSE SERPL-MCNC: 98 MG/DL (ref 70–99)
HDLC SERPL-MCNC: 54 MG/DL
LDLC SERPL CALC-MCNC: 136 MG/DL
NONHDLC SERPL-MCNC: 154 MG/DL
POTASSIUM SERPL-SCNC: 4.6 MMOL/L (ref 3.4–5.3)
SODIUM SERPL-SCNC: 137 MMOL/L (ref 133–144)
TRIGL SERPL-MCNC: 88 MG/DL

## 2020-08-31 PROCEDURE — 82274 ASSAY TEST FOR BLOOD FECAL: CPT | Performed by: INTERNAL MEDICINE

## 2020-09-03 DIAGNOSIS — Z12.11 SCREEN FOR COLON CANCER: ICD-10-CM

## 2020-09-09 LAB — HEMOCCULT STL QL IA: NEGATIVE

## 2020-12-27 ENCOUNTER — HEALTH MAINTENANCE LETTER (OUTPATIENT)
Age: 55
End: 2020-12-27

## 2021-10-09 ENCOUNTER — HEALTH MAINTENANCE LETTER (OUTPATIENT)
Age: 56
End: 2021-10-09

## 2022-07-14 ENCOUNTER — NURSE TRIAGE (OUTPATIENT)
Dept: NURSING | Facility: CLINIC | Age: 57
End: 2022-07-14

## 2022-07-14 NOTE — TELEPHONE ENCOUNTER
Pt calling with question about;    States he home tested positive today for covid.  Symptoms are minimal as follows;    Some body aches  Slight headache  Felt a bit chilled this AM  99.8 temp at time of this call    Denies;  Cough/chest congestion  Shortness of breath/difficulty breathing  Chronic health issues  Loss of taste or smell    Pt is wondering if he should request covid treatment.    According to the protocol, patient should be able to manage these symptoms at home.  Care advice given. Patient verbalizes understanding and agrees with plan of care.     Savi Oscar RN, Nurse Advisor 1:50 PM 7/14/2022  COVID 19 Nurse Triage Plan/Patient Instructions    Please be aware that novel coronavirus (COVID-19) may be circulating in the community. If you develop symptoms such as fever, cough, or SOB or if you have concerns about the presence of another infection including coronavirus (COVID-19), please contact your health care provider or visit [email or web address].     Disposition/Instructions    Home care recommended. Follow home care protocol based instructions.    Thank you for taking steps to prevent the spread of this virus.  o Limit your contact with others.  o Wear a simple mask to cover your cough.  o Wash your hands well and often.    Reason for Disposition    [1] COVID-19 diagnosed by positive lab test (e.g., PCR, rapid self-test kit) AND [2] mild symptoms (e.g., cough, fever, others) AND [3] no complications or SOB    Additional Information    Negative: SEVERE difficulty breathing (e.g., struggling for each breath, speaks in single words)    Negative: Difficult to awaken or acting confused (e.g., disoriented, slurred speech)    Negative: Bluish (or gray) lips or face now    Negative: Shock suspected (e.g., cold/pale/clammy skin, too weak to stand, low BP, rapid pulse)    Negative: Sounds like a life-threatening emergency to the triager    Negative: [1] Diagnosed or suspected COVID-19 AND [2]  symptoms lasting 3 or more weeks    Negative: [1] COVID-19 exposure AND [2] no symptoms    Negative: COVID-19 vaccine reaction suspected (e.g., fever, headache, muscle aches) occurring 1 to 3 days after getting vaccine    Negative: COVID-19 vaccine, questions about    Negative: [1] Lives with someone known to have influenza (flu test positive) AND [2] flu-like symptoms (e.g., cough, runny nose, sore throat, SOB; with or without fever)    Negative: [1] Adult with possible COVID-19 symptoms AND [2] triager concerned about severity of symptoms or other causes    Negative: COVID-19 and breastfeeding, questions about    Negative: SEVERE or constant chest pain or pressure  (Exception: Mild central chest pain, present only when coughing.)    Negative: MODERATE difficulty breathing (e.g., speaks in phrases, SOB even at rest, pulse 100-120)    Negative: Headache and stiff neck (can't touch chin to chest)    Negative: Oxygen level (e.g., pulse oximetry) 90 percent or lower    Negative: Chest pain or pressure    Negative: Patient sounds very sick or weak to the triager    Negative: [1] COVID-19 infection suspected by caller or triager AND [2] mild symptoms (cough, fever, or others) AND [3] negative COVID-19 rapid test    Negative: Fever present > 3 days (72 hours)    Negative: [1] Fever returns after gone for over 24 hours AND [2] symptoms worse or not improved    Negative: [1] Continuous (nonstop) coughing interferes with work or school AND [2] no improvement using cough treatment per Care Advice    Negative: Cough present > 3 weeks    Negative: [1] COVID-19 diagnosed by positive lab test (e.g., PCR, rapid self-test kit) AND [2] NO symptoms (e.g., cough, fever, others)    Protocols used: CORONAVIRUS (COVID-19) DIAGNOSED OR [id number] 1.18.2022

## 2022-07-22 ENCOUNTER — TELEPHONE (OUTPATIENT)
Dept: NURSING | Facility: CLINIC | Age: 57
End: 2022-07-22

## 2022-07-22 NOTE — TELEPHONE ENCOUNTER
Patient calling with questions regarding COVID. Reports he tested positive 7/14 and has questions about his test. Reports he continues to test positive for COVID but has no symptoms. Advised possible to continue to get a positive COVID test for up to 3 months per the CDC. Patient expresses understanding with nothing further needed.     Belinda Morrison RN 07/22/22 10:18 AM   [facility] Triage Nurse Advisor

## 2022-09-06 ENCOUNTER — TELEPHONE (OUTPATIENT)
Dept: GASTROENTEROLOGY | Facility: CLINIC | Age: 57
End: 2022-09-06

## 2022-09-06 ENCOUNTER — OFFICE VISIT (OUTPATIENT)
Dept: INTERNAL MEDICINE | Facility: CLINIC | Age: 57
End: 2022-09-06
Payer: COMMERCIAL

## 2022-09-06 VITALS
DIASTOLIC BLOOD PRESSURE: 82 MMHG | HEIGHT: 72 IN | TEMPERATURE: 97.7 F | OXYGEN SATURATION: 97 % | BODY MASS INDEX: 22.46 KG/M2 | WEIGHT: 165.8 LBS | HEART RATE: 63 BPM | SYSTOLIC BLOOD PRESSURE: 124 MMHG

## 2022-09-06 DIAGNOSIS — E78.5 HYPERLIPIDEMIA LDL GOAL <100: ICD-10-CM

## 2022-09-06 DIAGNOSIS — Z12.5 SCREENING FOR PROSTATE CANCER: ICD-10-CM

## 2022-09-06 DIAGNOSIS — Z12.11 SCREEN FOR COLON CANCER: ICD-10-CM

## 2022-09-06 DIAGNOSIS — Z00.00 ROUTINE GENERAL MEDICAL EXAMINATION AT A HEALTH CARE FACILITY: Primary | ICD-10-CM

## 2022-09-06 DIAGNOSIS — L98.9 SKIN LESION: ICD-10-CM

## 2022-09-06 LAB
ALBUMIN SERPL-MCNC: 3.8 G/DL (ref 3.4–5)
ALP SERPL-CCNC: 73 U/L (ref 40–150)
ALT SERPL W P-5'-P-CCNC: 19 U/L (ref 0–70)
ANION GAP SERPL CALCULATED.3IONS-SCNC: 3 MMOL/L (ref 3–14)
AST SERPL W P-5'-P-CCNC: 12 U/L (ref 0–45)
BASOPHILS # BLD AUTO: 0 10E3/UL (ref 0–0.2)
BASOPHILS NFR BLD AUTO: 1 %
BILIRUB SERPL-MCNC: 0.4 MG/DL (ref 0.2–1.3)
BUN SERPL-MCNC: 20 MG/DL (ref 7–30)
CALCIUM SERPL-MCNC: 9.1 MG/DL (ref 8.5–10.1)
CHLORIDE BLD-SCNC: 104 MMOL/L (ref 94–109)
CHOLEST SERPL-MCNC: 166 MG/DL
CO2 SERPL-SCNC: 29 MMOL/L (ref 20–32)
CREAT SERPL-MCNC: 0.97 MG/DL (ref 0.66–1.25)
EOSINOPHIL # BLD AUTO: 0.2 10E3/UL (ref 0–0.7)
EOSINOPHIL NFR BLD AUTO: 3 %
ERYTHROCYTE [DISTWIDTH] IN BLOOD BY AUTOMATED COUNT: 12.3 % (ref 10–15)
FASTING STATUS PATIENT QL REPORTED: YES
GFR SERPL CREATININE-BSD FRML MDRD: >90 ML/MIN/1.73M2
GLUCOSE BLD-MCNC: 99 MG/DL (ref 70–99)
HCT VFR BLD AUTO: 41.6 % (ref 40–53)
HDLC SERPL-MCNC: 50 MG/DL
HGB BLD-MCNC: 14 G/DL (ref 13.3–17.7)
LDLC SERPL CALC-MCNC: 103 MG/DL
LYMPHOCYTES # BLD AUTO: 1.5 10E3/UL (ref 0.8–5.3)
LYMPHOCYTES NFR BLD AUTO: 28 %
MCH RBC QN AUTO: 30.8 PG (ref 26.5–33)
MCHC RBC AUTO-ENTMCNC: 33.7 G/DL (ref 31.5–36.5)
MCV RBC AUTO: 92 FL (ref 78–100)
MONOCYTES # BLD AUTO: 0.6 10E3/UL (ref 0–1.3)
MONOCYTES NFR BLD AUTO: 11 %
NEUTROPHILS # BLD AUTO: 3.1 10E3/UL (ref 1.6–8.3)
NEUTROPHILS NFR BLD AUTO: 57 %
NONHDLC SERPL-MCNC: 116 MG/DL
PLATELET # BLD AUTO: 270 10E3/UL (ref 150–450)
POTASSIUM BLD-SCNC: 4.5 MMOL/L (ref 3.4–5.3)
PROT SERPL-MCNC: 7.3 G/DL (ref 6.8–8.8)
PSA SERPL-MCNC: 0.63 UG/L (ref 0–4)
RBC # BLD AUTO: 4.54 10E6/UL (ref 4.4–5.9)
SODIUM SERPL-SCNC: 136 MMOL/L (ref 133–144)
TRIGL SERPL-MCNC: 64 MG/DL
WBC # BLD AUTO: 5.4 10E3/UL (ref 4–11)

## 2022-09-06 PROCEDURE — G0103 PSA SCREENING: HCPCS | Performed by: INTERNAL MEDICINE

## 2022-09-06 PROCEDURE — 80061 LIPID PANEL: CPT | Performed by: INTERNAL MEDICINE

## 2022-09-06 PROCEDURE — 85025 COMPLETE CBC W/AUTO DIFF WBC: CPT | Performed by: INTERNAL MEDICINE

## 2022-09-06 PROCEDURE — 80053 COMPREHEN METABOLIC PANEL: CPT | Performed by: INTERNAL MEDICINE

## 2022-09-06 PROCEDURE — 99396 PREV VISIT EST AGE 40-64: CPT | Performed by: INTERNAL MEDICINE

## 2022-09-06 PROCEDURE — 36415 COLL VENOUS BLD VENIPUNCTURE: CPT | Performed by: INTERNAL MEDICINE

## 2022-09-06 ASSESSMENT — ENCOUNTER SYMPTOMS
ABDOMINAL PAIN: 0
WEAKNESS: 0
HEMATURIA: 0
FEVER: 0
SORE THROAT: 0
FREQUENCY: 0
SHORTNESS OF BREATH: 0
EYE PAIN: 0
DIZZINESS: 0
ARTHRALGIAS: 0
PARESTHESIAS: 0
CHILLS: 0
COUGH: 0
MYALGIAS: 0
PALPITATIONS: 0
HEADACHES: 0
NAUSEA: 0
DIARRHEA: 0
DYSURIA: 0
HEARTBURN: 0
CONSTIPATION: 0
JOINT SWELLING: 0
NERVOUS/ANXIOUS: 0
HEMATOCHEZIA: 0

## 2022-09-06 NOTE — PATIENT INSTRUCTIONS
- I will send you a message on [employer] when I am able to look at the results of your tests from today  - Make an appointment with our pharmacy downstairs or stop by your preferred pharmacy to discuss completing the Shingrix (shingles) vaccine series and tetanus shot

## 2022-09-06 NOTE — TELEPHONE ENCOUNTER
Screening Questions  BlueKIND OF PREP RedLOCATION [review exclusion criteria] GreenSEDATION TYPE      1.  Yes - Mail and [employer] Are you active on [employer]?       2.  Santino Akins MD Ordering/Referring Provider:      3. ucare  What insurance is in the chart?    4.  y Do you have a legal guardian or medical Power of ?              Are you able to give consent for your medical care?                (Sedation review/consideration needed)      5.   22.49  BMI  [BMI OVER 40-EXTENDED PREP]  If greater than 40 review exclusion criteria [PAC APPT IF @ [facility]]       6.   Middle of July Have you had a positive covid test in the last 90 days?      7.   Respiratory Screening :  [If yes to any of the following HOSPITAL setting only]                     n Do you use daily home oxygen?   n Do you have mod to severe Obstructive Sleep Apnea?  [OKAY @ [facility] SH PH RI]                  n Do you have Pulmonary Hypertension?                   n Do you have UNCONTROLLED asthma?         8.   n Have you had a heart or lung transplant?       9.   n Are you currently on dialysis?   [ If yes, G-PREP & HOSPITAL setting only]      10.   n  Do you have chronic kidney disease?  [ If yes, G-PREP ]     11.  n Have you had a stroke or Transient ischemic attack (TIA - aka  mini stroke ) within 6 months?   (If yes, please review exclusion criteria)     12.   n In the past 6 months, have you had any heart related issues including cardiomyopathy or heart attack?           n If yes, did it require cardiac stenting or other implantable device?       13.   n Do you have any implantable devices in your body (pacemaker, defib, LVAD)?  (If yes, please review exclusion criteria)     14.   n Do you take nitroglycerin?            n If yes, how often? n  (if yes, HOSPITAL setting ONLY)     15.   n Are you currently taking any blood thinners?           [IF YES, INFORM PATIENT TO FOLLOW UP W/ ORDERING PROVIDER FOR BRIDGING INSTRUCTIONS]       16.    n  Do you have a diagnosis of diabetes?  [ If yes, G-PREP ]     17.   [FEMALES] n Are you currently pregnant?    n If yes, how many weeks?      18.    n  Are you taking any prescription pain medications on a routine schedule?    [ If yes, EXTENDED PREP.] [If yes, MAC]    22.  Do you take the medication Phentermine?     Yes-> Hold for 7 days before procedure.  Please consult your prescribing provider if you have questions about holding this medication.     No-> Continue to next question.     19.   Pot Do you have any chemical dependencies such as alcohol, street drugs, or methadone?   [If yes, MAC]     20.   n Do you have any history of post-traumatic stress syndrome, severe anxiety or history of psychosis?   [If yes, MAC]     21.   y Do you transfer independently?       22.   n  On a regular basis do you go 3-5 days between bowel movements?  [ If yes, EXTENDED PREP.]     23.    Preferred LOCAL Pharmacy for Pre Prescription     [id number] [place] [address]          Scheduling Details         Samuel : Caller   (Please ask for phone number if not scheduled by patient)    Lower Endoscopy [Colonoscopy] : Type of Procedure Scheduled   gprep Which Colonoscopy Prep was Sent?      nayla Surgeon    10/31 Date of Procedure    Location    MOD Sedation Type     Conscious Sedation- Needs  for 6 hours after the procedure  MAC/General-Needs  for 24 hours after procedure     N Pre-op Required at [facility], [place], [place] and OR for MAC sedation   (advise patient they will need a pre-op prior to procedure -)       Y Informed patient they will need an adult    Cannot take any type of public or medical transportation alone     HOME Pre-Procedure Covid test to be completed at [facility] or Externally  [[facility] PCR Testing Required]     y  Confirmed Nurse will call to complete assessment     Additional comments:

## 2022-09-06 NOTE — PROGRESS NOTES
SUBJECTIVE:   CC: Samuel Urias is an 57 year old male who presents for preventative health visit.     Patient has been advised of split billing requirements and indicates understanding: Yes     Healthy Habits:     Getting at least 3 servings of Calcium per day:  NO    Bi-annual eye exam:  NO    Dental care twice a year:  Yes    Sleep apnea or symptoms of sleep apnea:  None    Diet:  Regular (no restrictions) and Breakfast skipped    Frequency of exercise:  2-3 days/week    Duration of exercise:  15-30 minutes    Taking medications regularly:  Yes    Medication side effects:  None    PHQ-2 Total Score: 0    Additional concerns today:  Yes    Samuel presents today for a physical exam. This is the first time I have met Samuel. He has no acute complaints.    Today's PHQ-2 Score:   PHQ-2 ( 1999 Pfizer) 9/6/2022   Q1: Little interest or pleasure in doing things 0   Q2: Feeling down, depressed or hopeless 0   PHQ-2 Score 0   PHQ-2 Total Score (12-17 Years)- Positive if 3 or more points; Administer PHQ-A if positive -   Q1: Little interest or pleasure in doing things Not at all   Q2: Feeling down, depressed or hopeless Not at all   PHQ-2 Score 0     Abuse: Current or Past (Physical, Sexual or Emotional)- No  Do you feel safe in your environment? Yes    Social History     Tobacco Use     Smoking status: Never Smoker     Smokeless tobacco: Never Used   Substance Use Topics     Alcohol use: Yes     Comment: rarely     If you drink alcohol do you typically have >3 drinks per day or >7 drinks per week? No    Alcohol Use 9/6/2022   Prescreen: >3 drinks/day or >7 drinks/week? No   Prescreen: >3 drinks/day or >7 drinks/week? -     Last PSA:   PSA   Date Value Ref Range Status   08/24/2020 0.47 0 - 4 ug/L Final     Comment:     Assay Method:  Chemiluminescence using Siemens Vista analyzer     Reviewed orders with patient. Reviewed health maintenance and updated orders accordingly - Yes    Labs reviewed in EPIC    Reviewed and  updated as needed this visit by clinical staff   Tobacco  Allergies  Meds  Problems  Med Hx  Surg Hx  Fam Hx          Reviewed and updated as needed this visit by Provider   Tobacco  Allergies  Meds  Problems  Med Hx  Surg Hx  Fam Hx           Review of Systems   Constitutional: Negative for chills and fever.   HENT: Negative for congestion, ear pain, hearing loss and sore throat.    Eyes: Negative for pain and visual disturbance.   Respiratory: Negative for cough and shortness of breath.    Cardiovascular: Negative for chest pain, palpitations and peripheral edema.   Gastrointestinal: Negative for abdominal pain, constipation, diarrhea, heartburn, hematochezia and nausea.   Genitourinary: Negative for dysuria, frequency, genital sores, hematuria, impotence, penile discharge and urgency.   Musculoskeletal: Negative for arthralgias, joint swelling and myalgias.   Skin: Negative for rash.   Neurological: Negative for dizziness, weakness, headaches and paresthesias.   Psychiatric/Behavioral: Negative for mood changes. The patient is not nervous/anxious.      OBJECTIVE:   /82   Pulse 63   Temp 97.7  F (36.5  C) (Temporal)   Ht 1.829 m (6')   Wt 75.2 kg (165 lb 12.8 oz)   SpO2 97%   BMI 22.49 kg/m      Physical Exam  GENERAL: In no distress.  EYES: Conjunctivae/corneas clear. EOMs grossly intact.  HENT: NC/AT, facies symmetric. Neck supple. No LAD or thyromegaly noted.  RESP: CTAB. No w/r/r.  CV: RRR, no m/r/g.  GI: NT, ND, without rebound or guarding, no CVA tenderness, no hepatomegaly appreciated.  MSK: Moves all four extremities freely.  SKIN: No significant ulcers, lesions or rashes on the visualized portions of the skin  NEURO: Alert. Oriented.  PSYCH: Linear thought process. Speech normal rate and volume. No tangential thoughts, hallucinations, or delusions.    Diagnostic Test Results: Labs reviewed in Epic    ASSESSMENT/PLAN:   Routine general medical examination at a [facility]  facility  Reviewed PMH. Discussed healthcare maintenance issues, including cancer screenings (UTD/not due), relevant immunizations (UTD/declined today), and cardiac risk factor screenings such as for cholesterol, HTN, and DM.    Hyperlipidemia LDL goal <100  Fasting labs today.  - Comprehensive metabolic panel; Future  - Lipid panel reflex to direct LDL Fasting; Future  - CBC with Platelets & Differential; Future    Skin lesion  Referral placed for skin check per patient request.  - Adult Dermatology Referral; Future    Screening for prostate cancer  PSA WNL in past but of note he does take finasteride for male-pattern baldness.  - Prostate Specific Antigen Screen; Future    Screen for colon cancer  Referral placed.  - Colonoscopy Screening  Referral; Future    COUNSELING:   Reviewed preventive health counseling, as reflected in patient instructions    Estimated body mass index is 22.49 kg/m  as calculated from the following:    Height as of this encounter: 1.829 m (6').    Weight as of this encounter: 75.2 kg (165 lb 12.8 oz).     He reports that he has never smoked. He has never used smokeless tobacco.    Santino Blankenship MD  [facility]

## 2022-09-17 ENCOUNTER — HEALTH MAINTENANCE LETTER (OUTPATIENT)
Age: 57
End: 2022-09-17

## 2022-10-24 ENCOUNTER — TELEPHONE (OUTPATIENT)
Dept: GASTROENTEROLOGY | Facility: CLINIC | Age: 57
End: 2022-10-24

## 2022-10-24 DIAGNOSIS — Z12.11 ENCOUNTER FOR SCREENING COLONOSCOPY: Primary | ICD-10-CM

## 2022-10-24 RX ORDER — BISACODYL 5 MG/1
TABLET, DELAYED RELEASE ORAL
Qty: 4 TABLET | Refills: 0 | Status: ON HOLD | OUTPATIENT
Start: 2022-10-24 | End: 2022-10-31

## 2022-10-24 NOTE — TELEPHONE ENCOUNTER
Pre assessment questions completed for upcoming colonoscopy  procedure scheduled on 10/31/22    COVID policy reviewed. Patient to complete rapid antigen test one to two days before their scheduled procedure. Patient to bring photo of the results when they come in for their procedure.    Reviewed procedural arrival time 0800 and facility location .    Designated  policy reviewed. Instructed to have someone stay 6 hours post procedure.     Procedure indication: screening     Bowel prep recommendation: Golytely d/t mg citrate     Golytely  prep script sent to [facility] [id number] [place] [address] pharmacy. Prep instructions sent via [employer].    Reviewed Golytely prep instructions. No fiber/iron supplements or foods that contain nuts/seeds 7 days prior to procedure.     Anticoagulation/blood thinners? no    Diabetic? No    Patient verbalized understanding and had no questions or concerns at this time.    Elena Campoverde RN

## 2022-10-31 ENCOUNTER — HOSPITAL ENCOUNTER (OUTPATIENT)
Facility: CLINIC | Age: 57
Discharge: HOME | End: 2022-10-31
Attending: INTERNAL MEDICINE | Admitting: INTERNAL MEDICINE
Payer: COMMERCIAL

## 2022-10-31 VITALS
BODY MASS INDEX: 22.35 KG/M2 | HEART RATE: 51 BPM | RESPIRATION RATE: 13 BRPM | WEIGHT: 165 LBS | HEIGHT: 72 IN | SYSTOLIC BLOOD PRESSURE: 115 MMHG | OXYGEN SATURATION: 100 % | DIASTOLIC BLOOD PRESSURE: 71 MMHG

## 2022-10-31 LAB — COLONOSCOPY: NORMAL

## 2022-10-31 PROCEDURE — 45385 COLONOSCOPY W/LESION REMOVAL: CPT | Mod: PT | Performed by: INTERNAL MEDICINE

## 2022-10-31 PROCEDURE — 99153 MOD SED SAME PHYS/QHP EA: CPT | Performed by: INTERNAL MEDICINE

## 2022-10-31 PROCEDURE — G0500 MOD SEDAT ENDO SERVICE >5YRS: HCPCS | Performed by: INTERNAL MEDICINE

## 2022-10-31 PROCEDURE — 88305 TISSUE EXAM BY PATHOLOGIST: CPT | Mod: TC | Performed by: INTERNAL MEDICINE

## 2022-10-31 PROCEDURE — 88305 TISSUE EXAM BY PATHOLOGIST: CPT | Mod: 26 | Performed by: PATHOLOGY

## 2022-10-31 PROCEDURE — 45380 COLONOSCOPY AND BIOPSY: CPT | Performed by: INTERNAL MEDICINE

## 2022-10-31 PROCEDURE — 250N000011 HC RX IP 250 OP 636: Performed by: INTERNAL MEDICINE

## 2022-10-31 RX ORDER — ONDANSETRON 2 MG/ML
4 INJECTION INTRAMUSCULAR; INTRAVENOUS EVERY 6 HOURS PRN
Status: CANCELLED | OUTPATIENT
Start: 2022-10-31

## 2022-10-31 RX ORDER — ONDANSETRON 2 MG/ML
4 INJECTION INTRAMUSCULAR; INTRAVENOUS
Status: DISCONTINUED | OUTPATIENT
Start: 2022-10-31 | End: 2022-10-31 | Stop reason: HOSPADM

## 2022-10-31 RX ORDER — NALOXONE HYDROCHLORIDE 0.4 MG/ML
0.4 INJECTION, SOLUTION INTRAMUSCULAR; INTRAVENOUS; SUBCUTANEOUS
Status: CANCELLED | OUTPATIENT
Start: 2022-10-31

## 2022-10-31 RX ORDER — LIDOCAINE 40 MG/G
CREAM TOPICAL
Status: DISCONTINUED | OUTPATIENT
Start: 2022-10-31 | End: 2022-10-31 | Stop reason: HOSPADM

## 2022-10-31 RX ORDER — PROCHLORPERAZINE MALEATE 10 MG
10 TABLET ORAL EVERY 6 HOURS PRN
Status: CANCELLED | OUTPATIENT
Start: 2022-10-31

## 2022-10-31 RX ORDER — NALOXONE HYDROCHLORIDE 0.4 MG/ML
0.2 INJECTION, SOLUTION INTRAMUSCULAR; INTRAVENOUS; SUBCUTANEOUS
Status: CANCELLED | OUTPATIENT
Start: 2022-10-31

## 2022-10-31 RX ORDER — FENTANYL CITRATE 50 UG/ML
INJECTION, SOLUTION INTRAMUSCULAR; INTRAVENOUS PRN
Status: DISCONTINUED | OUTPATIENT
Start: 2022-10-31 | End: 2022-10-31 | Stop reason: HOSPADM

## 2022-10-31 RX ORDER — ONDANSETRON 4 MG/1
4 TABLET, ORALLY DISINTEGRATING ORAL EVERY 6 HOURS PRN
Status: CANCELLED | OUTPATIENT
Start: 2022-10-31

## 2022-10-31 RX ORDER — FLUMAZENIL 0.1 MG/ML
0.2 INJECTION, SOLUTION INTRAVENOUS
Status: CANCELLED | OUTPATIENT
Start: 2022-10-31 | End: 2022-10-31

## 2022-10-31 ASSESSMENT — ACTIVITIES OF DAILY LIVING (ADL): ADLS_ACUITY_SCORE: 35

## 2022-10-31 NOTE — H&P
Samuel Urias  [id number]  male  57 year old      Reason for procedure/surgery: Screening    Patient Active Problem List   Diagnosis     Cannabis abuse       Past Surgical History:  History reviewed. No pertinent surgical history.    Past Medical History:   Past Medical History:   Diagnosis Date     Chronic periodontal disease      History of cannabis abuse 08/29/2017    stopped 8/2017       Social History:   Social History     Tobacco Use     Smoking status: Never     Smokeless tobacco: Never   Substance Use Topics     Alcohol use: Yes     Comment: rarely       Family History:   Family History   Problem Relation Age of Onset     Hypertension Father      Coronary Artery Disease Father 56       Allergies: No Known Allergies    Active Medications:   No current outpatient medications on file.       Systemic Review:   CONSTITUTIONAL: NEGATIVE for fever, chills, change in weight  ENT/MOUTH: NEGATIVE for ear, mouth and throat problems  RESP: NEGATIVE for significant cough or SOB  CV: NEGATIVE for chest pain, palpitations or peripheral edema    Physical Examination:   Vital Signs: /68   Pulse 65   Resp 14   Ht 1.829 m (6')   Wt 74.8 kg (165 lb)   SpO2 100%   BMI 22.38 kg/m    GENERAL: healthy, alert and no distress  NECK: no adenopathy, no asymmetry, masses, or scars  RESP: lungs clear to auscultation - no rales, rhonchi or wheezes  CV: regular rate and rhythm, normal S1 S2, no S3 or S4, no murmur, click or rub, no peripheral edema and peripheral pulses strong  ABDOMEN: soft, nontender, no hepatosplenomegaly, no masses and bowel sounds normal  MS: no gross musculoskeletal defects noted, no edema    ASA Classification: (I)  Normal healthy patient  Airway Exam: Mallampati Score: Class I (Complete visualization of soft palate)    Plan: Appropriate to proceed as scheduled.      Bo Campa MD  10/31/2022    PCP:  No Ref-Primary, Physician

## 2022-11-01 LAB
PATH REPORT.COMMENTS IMP SPEC: NORMAL
PATH REPORT.COMMENTS IMP SPEC: NORMAL
PATH REPORT.FINAL DX SPEC: NORMAL
PATH REPORT.GROSS SPEC: NORMAL
PATH REPORT.MICROSCOPIC SPEC OTHER STN: NORMAL
PATH REPORT.RELEVANT HX SPEC: NORMAL
PHOTO IMAGE: NORMAL

## 2023-02-08 ENCOUNTER — OFFICE VISIT (OUTPATIENT)
Dept: FAMILY MEDICINE | Facility: CLINIC | Age: 58
End: 2023-02-08
Payer: COMMERCIAL

## 2023-02-08 DIAGNOSIS — L82.1 SEBORRHEIC KERATOSES: ICD-10-CM

## 2023-02-08 DIAGNOSIS — D18.01 CHERRY ANGIOMA: ICD-10-CM

## 2023-02-08 DIAGNOSIS — L81.4 LENTIGINES: ICD-10-CM

## 2023-02-08 DIAGNOSIS — L57.0 AK (ACTINIC KERATOSIS): ICD-10-CM

## 2023-02-08 DIAGNOSIS — Z12.83 ENCOUNTER FOR SCREENING FOR MALIGNANT NEOPLASM OF SKIN: ICD-10-CM

## 2023-02-08 DIAGNOSIS — D22.9 MULTIPLE BENIGN NEVI: Primary | ICD-10-CM

## 2023-02-08 PROCEDURE — 17000 DESTRUCT PREMALG LESION: CPT | Performed by: NURSE PRACTITIONER

## 2023-02-08 PROCEDURE — 99203 OFFICE O/P NEW LOW 30 MIN: CPT | Mod: 25 | Performed by: NURSE PRACTITIONER

## 2023-02-08 ASSESSMENT — PAIN SCALES - GENERAL: PAINLEVEL: NO PAIN (0)

## 2023-02-08 NOTE — PROGRESS NOTES
[facility] Dermatology Note  Encounter Date: Feb 8, 2023  Office Visit     Reviewed patients past medical history and pertinent chart review prior to patients visit today.     Dermatology Problem List:  ak    ____________________________________________    Assessment & Plan:     # Actinic keratosis, dorsum nose. Premalignant nature discussed with patient. Treatment options discussed with patient today including no treatment, topical treatment, and cryotherapy. Patient elects to treat visible lesions today with cryotherapy. After verbal consent and discussion of risks and benefits including but no limited to dyspigmentation/scar, blister, and pain. A total of 1 actinic keratoses were treated with 1-2mm freeze border for 2 cycle with liquid nitrogen. Post cryotherapy instructions were provided.     # Benign skin findings including: seborrheic keratoses, cherry angioma, lentigines and benign nevi.   - No further intervention required. Patient to report changes.   - Patient reassured of the benign nature of these lesions.    #Signs and Symptoms of non-melanoma skin cancer and ABCDEs of melanoma reviewed with patient. Patient encouraged to perform monthly self skin exams and educated on how to perform them. UV precautions reviewed with patient. Patient was asked about new or changing moles/lesions on body.     #Reviewed Sunscreen: Apply 20 minutes prior to going outdoors and reapply every two hours, when wet or sweating. We recommend using an SPF 30 or higher, and to use one that is water resistant.       Follow-up:  1-2 years for follow up full body skin exam, prn for new or changing lesions or new concerns    Julissa Esparza, APRN CNP on 2/8/2023 at 2:17 PM    ____________________________________________    CC: Skin Check ([facility], Hx. Of AK's)    HPI:  Mr. Samuel Urias is a(n) 58 year old male who presents today as a new patient for a full body skin cancer screening. Patient has no specific  concerns today.     Patient is otherwise feeling well, without additional skin concerns.     Physical Exam:  Vitals: There were no vitals taken for this visit.  SKIN: Total skin excluding the genitalia areas was performed. The exam included the head/face, neck, both arms, chest, back, abdomen, both legs, digits, mons pubis, buttock and nails.   -Pink scaly macule on the dorsum nose  -Left arch of foot, 4 mm pink/brown homogenous papule  -several 1-2mm red dome shaped symmetric papules scattered on the trunk  -multiple tan/brown flat round macules and raised papules scattered throughout trunk, extremities and head. No worrisome features for malignancy noted on examination.  -scattered tan, homogenous macules scattered on sun exposed areas of trunk, extremities and face.   -scattered waxy, stuck on tan/brown papules and patches on the trunk and extremities    - No other lesions of concern on areas examined.     Medications:  Current Outpatient Medications   Medication     finasteride (PROSCAR) 5 MG tablet     Minoxidil (ROGAINE MENS EXTRA STRENGTH EX)     No current facility-administered medications for this visit.      Past Medical History:   Patient Active Problem List   Diagnosis     Cannabis abuse     Past Medical History:   Diagnosis Date     Chronic periodontal disease      History of cannabis abuse 08/29/2017    stopped 8/2017       CC Santino Akins MD  [address] [place] [address] on close of this encounter.

## 2023-02-08 NOTE — LETTER
2/8/2023         RE: Samuel Urias  [address]  [facility] [place] [address]        Dear Colleague,    Thank you for referring your patient, Samuel Urias, to the [facility]. Please see a copy of my visit note below.    [facility] Dermatology Note  Encounter Date: Feb 8, 2023  Office Visit     Reviewed patients past medical history and pertinent chart review prior to patients visit today.     Dermatology Problem List:  ak    ____________________________________________    Assessment & Plan:     # Actinic keratosis, dorsum nose. Premalignant nature discussed with patient. Treatment options discussed with patient today including no treatment, topical treatment, and cryotherapy. Patient elects to treat visible lesions today with cryotherapy. After verbal consent and discussion of risks and benefits including but no limited to dyspigmentation/scar, blister, and pain. A total of 1 actinic keratoses were treated with 1-2mm freeze border for 2 cycle with liquid nitrogen. Post cryotherapy instructions were provided.     # Benign skin findings including: seborrheic keratoses, cherry angioma, lentigines and benign nevi.   - No further intervention required. Patient to report changes.   - Patient reassured of the benign nature of these lesions.    #Signs and Symptoms of non-melanoma skin cancer and ABCDEs of melanoma reviewed with patient. Patient encouraged to perform monthly self skin exams and educated on how to perform them. UV precautions reviewed with patient. Patient was asked about new or changing moles/lesions on body.     #Reviewed Sunscreen: Apply 20 minutes prior to going outdoors and reapply every two hours, when wet or sweating. We recommend using an SPF 30 or higher, and to use one that is water resistant.       Follow-up:  1-2 years for follow up full body skin exam, prn for new or changing lesions or new concerns    MURALI Daniels CNP on 2/8/2023 at  2:17 PM    ____________________________________________    CC: Skin Check ([facility], Hx. Of AK's)    HPI:  Mr. Samuel Urias is a(n) 58 year old male who presents today as a new patient for a full body skin cancer screening. Patient has no specific concerns today.     Patient is otherwise feeling well, without additional skin concerns.     Physical Exam:  Vitals: There were no vitals taken for this visit.  SKIN: Total skin excluding the genitalia areas was performed. The exam included the head/face, neck, both arms, chest, back, abdomen, both legs, digits, mons pubis, buttock and nails.   -Pink scaly macule on the dorsum nose  -Left arch of foot, 4 mm pink/brown homogenous papule  -several 1-2mm red dome shaped symmetric papules scattered on the trunk  -multiple tan/brown flat round macules and raised papules scattered throughout trunk, extremities and head. No worrisome features for malignancy noted on examination.  -scattered tan, homogenous macules scattered on sun exposed areas of trunk, extremities and face.   -scattered waxy, stuck on tan/brown papules and patches on the trunk and extremities    - No other lesions of concern on areas examined.     Medications:  Current Outpatient Medications   Medication     finasteride (PROSCAR) 5 MG tablet     Minoxidil (ROGAINE MENS EXTRA STRENGTH EX)     No current facility-administered medications for this visit.      Past Medical History:   Patient Active Problem List   Diagnosis     Cannabis abuse     Past Medical History:   Diagnosis Date     Chronic periodontal disease      History of cannabis abuse 08/29/2017    stopped 8/2017       CC Santino Akins MD  [address] [place] [address] on close of this encounter.      Again, thank you for allowing me to participate in the care of your patient.        Sincerely,        MURALI Dnaiels CNP

## 2023-10-07 ENCOUNTER — HEALTH MAINTENANCE LETTER (OUTPATIENT)
Age: 58
End: 2023-10-07

## 2024-11-30 ENCOUNTER — HEALTH MAINTENANCE LETTER (OUTPATIENT)
Age: 59
End: 2024-11-30

## (undated) RX ORDER — FENTANYL CITRATE 50 UG/ML
INJECTION, SOLUTION INTRAMUSCULAR; INTRAVENOUS
Status: DISPENSED
Start: 2022-10-31